# Patient Record
Sex: MALE | Race: WHITE
[De-identification: names, ages, dates, MRNs, and addresses within clinical notes are randomized per-mention and may not be internally consistent; named-entity substitution may affect disease eponyms.]

---

## 2019-03-27 ENCOUNTER — HOSPITAL ENCOUNTER (EMERGENCY)
Dept: HOSPITAL 26 - MED | Age: 29
Discharge: HOME | End: 2019-03-27
Payer: COMMERCIAL

## 2019-03-27 VITALS — SYSTOLIC BLOOD PRESSURE: 121 MMHG | DIASTOLIC BLOOD PRESSURE: 68 MMHG

## 2019-03-27 VITALS — DIASTOLIC BLOOD PRESSURE: 70 MMHG | SYSTOLIC BLOOD PRESSURE: 118 MMHG

## 2019-03-27 VITALS — HEIGHT: 69 IN | WEIGHT: 239 LBS | BODY MASS INDEX: 35.4 KG/M2

## 2019-03-27 DIAGNOSIS — E86.0: Primary | ICD-10-CM

## 2019-03-27 DIAGNOSIS — J45.909: ICD-10-CM

## 2019-03-27 PROCEDURE — 99283 EMERGENCY DEPT VISIT LOW MDM: CPT

## 2019-03-27 PROCEDURE — 96360 HYDRATION IV INFUSION INIT: CPT

## 2019-03-27 NOTE — NUR
29 Y M C/O DIARRHEA X 6 DAYS. -FEVER, +NAUSEATED, -VOMITING. BOWEL SOUNDS 
ACTIVE IN ALL 4 QUADRANTS. LAST BM THIS MORNING. PT STATES HE FEELS DEHYDRATED. 
NO PAIN. [id number]. VSS AT THIS TIME. BED IS DOWN, LOCKED, BED RAIL X 1,ERMD 
NOTIFIED. PT PLACED IN GOWN.

PMH- DIAGNOSED WITH ASTHMA AS A CHILD

RX-NONE